# Patient Record
Sex: FEMALE | Race: OTHER | RURAL
[De-identification: names, ages, dates, MRNs, and addresses within clinical notes are randomized per-mention and may not be internally consistent; named-entity substitution may affect disease eponyms.]

---

## 2019-10-29 NOTE — PATIENT DISCUSSION
CATARACTS DISCUSSION, RECOMMEND SURGERY: Educated patient about diagnosis of cataracts and that the condition will only worsen with time. It will not get better. The only way to improve vision that has been affected by cataracts and where spectacle correction is no longer helping, is surgery.  Explained that it is recommend she have a cataract evaluation with Dr. Shane Vázquez M.D.

## 2019-10-29 NOTE — PATIENT DISCUSSION
CATARACTS OU: SPECTACLES WILL NOT IMPROVE CONDITION. RECOMMEND PATIENT HAVE CATARACT EVALUATION WITH DR. Letitia Velez FOR CATARACT SURGERY.

## 2022-06-08 ENCOUNTER — NEW PATIENT (OUTPATIENT)
Dept: RURAL CLINIC 1 | Facility: CLINIC | Age: 32
End: 2022-06-08

## 2022-06-08 DIAGNOSIS — H52.13: ICD-10-CM

## 2022-06-08 PROCEDURE — S0620 ROUTINE OPHTHALMOLOGICAL EXA: HCPCS

## 2022-06-08 ASSESSMENT — TONOMETRY
OS_IOP_MMHG: 20
OD_IOP_MMHG: 20

## 2022-06-08 ASSESSMENT — VISUAL ACUITY
OD_SC: 20/20-2
OS_SC: 20/20-2

## 2024-10-07 ENCOUNTER — COMPREHENSIVE EXAM (OUTPATIENT)
Dept: RURAL CLINIC 1 | Facility: CLINIC | Age: 34
End: 2024-10-07

## 2024-10-07 DIAGNOSIS — H40.013: ICD-10-CM

## 2024-10-07 DIAGNOSIS — H52.13: ICD-10-CM

## 2024-10-07 PROCEDURE — S0621AEC ROUTINE OPH EXAM INCLUDES REF/ EST PATIENT
